# Patient Record
Sex: FEMALE | Race: WHITE
[De-identification: names, ages, dates, MRNs, and addresses within clinical notes are randomized per-mention and may not be internally consistent; named-entity substitution may affect disease eponyms.]

---

## 2018-05-04 ENCOUNTER — HOSPITAL ENCOUNTER (EMERGENCY)
Dept: HOSPITAL 31 - C.ER | Age: 43
Discharge: HOME | End: 2018-05-04
Payer: COMMERCIAL

## 2018-05-04 VITALS — BODY MASS INDEX: 30.6 KG/M2

## 2018-05-04 VITALS
OXYGEN SATURATION: 99 % | HEART RATE: 78 BPM | SYSTOLIC BLOOD PRESSURE: 128 MMHG | TEMPERATURE: 99.2 F | DIASTOLIC BLOOD PRESSURE: 76 MMHG

## 2018-05-04 VITALS — RESPIRATION RATE: 18 BRPM

## 2018-05-04 DIAGNOSIS — T62.91XA: Primary | ICD-10-CM

## 2018-05-04 DIAGNOSIS — R11.2: ICD-10-CM

## 2018-05-04 LAB
ALBUMIN SERPL-MCNC: 3.7 G/DL (ref 3.5–5)
ALBUMIN/GLOB SERPL: 1.1 {RATIO} (ref 1–2.1)
ALT SERPL-CCNC: 24 U/L (ref 9–52)
APTT BLD: 28 SECONDS (ref 21–34)
AST SERPL-CCNC: 20 U/L (ref 14–36)
BASOPHILS # BLD AUTO: 0 K/UL (ref 0–0.2)
BASOPHILS NFR BLD: 0.3 % (ref 0–2)
BILIRUB UR-MCNC: NEGATIVE MG/DL
BUN SERPL-MCNC: 10 MG/DL (ref 7–17)
CALCIUM SERPL-MCNC: 8.4 MG/DL (ref 8.6–10.4)
EOSINOPHIL # BLD AUTO: 0.1 K/UL (ref 0–0.7)
EOSINOPHIL NFR BLD: 1.3 % (ref 0–4)
ERYTHROCYTE [DISTWIDTH] IN BLOOD BY AUTOMATED COUNT: 13.5 % (ref 11.5–14.5)
GFR NON-AFRICAN AMERICAN: > 60
GLUCOSE UR STRIP-MCNC: NORMAL MG/DL
HCG,QUALITATIVE URINE: NEGATIVE
HGB BLD-MCNC: 14.3 G/DL (ref 11–16)
INR PPP: 1.3
LEUKOCYTE ESTERASE UR-ACNC: (no result) LEU/UL
LIPASE: 70 U/L (ref 23–300)
LYMPHOCYTES # BLD AUTO: 0.9 K/UL (ref 1–4.3)
LYMPHOCYTES NFR BLD AUTO: 11.1 % (ref 20–40)
MCH RBC QN AUTO: 30.6 PG (ref 27–31)
MCHC RBC AUTO-ENTMCNC: 35 G/DL (ref 33–37)
MCV RBC AUTO: 87.6 FL (ref 81–99)
MONOCYTES # BLD: 0.4 K/UL (ref 0–0.8)
MONOCYTES NFR BLD: 5.5 % (ref 0–10)
NEUTROPHILS # BLD: 6.5 K/UL (ref 1.8–7)
NEUTROPHILS NFR BLD AUTO: 81.8 % (ref 50–75)
NRBC BLD AUTO-RTO: 0 % (ref 0–2)
PH UR STRIP: 6 [PH] (ref 5–8)
PLATELET # BLD: 279 K/UL (ref 130–400)
PMV BLD AUTO: 7.4 FL (ref 7.2–11.7)
PROT UR STRIP-MCNC: NEGATIVE MG/DL
PROTHROMBIN TIME: 14.3 SECONDS (ref 9.7–12.2)
RBC # BLD AUTO: 4.67 MIL/UL (ref 3.8–5.2)
RBC # UR STRIP: NEGATIVE /UL
SP GR UR STRIP: 1.02 (ref 1–1.03)
SQUAMOUS EPITHIAL: 1 /HPF (ref 0–5)
UROBILINOGEN UR-MCNC: NORMAL MG/DL (ref 0.2–1)
WBC # BLD AUTO: 8 K/UL (ref 4.8–10.8)

## 2018-05-04 PROCEDURE — 85610 PROTHROMBIN TIME: CPT

## 2018-05-04 PROCEDURE — 85025 COMPLETE CBC W/AUTO DIFF WBC: CPT

## 2018-05-04 PROCEDURE — 85730 THROMBOPLASTIN TIME PARTIAL: CPT

## 2018-05-04 PROCEDURE — 99285 EMERGENCY DEPT VISIT HI MDM: CPT

## 2018-05-04 PROCEDURE — 80053 COMPREHEN METABOLIC PANEL: CPT

## 2018-05-04 PROCEDURE — 83690 ASSAY OF LIPASE: CPT

## 2018-05-04 PROCEDURE — 84703 CHORIONIC GONADOTROPIN ASSAY: CPT

## 2018-05-04 PROCEDURE — 81001 URINALYSIS AUTO W/SCOPE: CPT

## 2018-05-04 PROCEDURE — 96374 THER/PROPH/DIAG INJ IV PUSH: CPT

## 2018-05-04 NOTE — C.PDOC
History Of Present Illness


Patient presents to the ER with a complaint of nausea, vomiting, and abdominal 

pain that began tonight. Patient reports she had rice, beans, and eggs for 

dinner prior to onset of symptoms. Denies fever or diarrhea.


Time Seen by Provider: 05/04/18 00:52


Chief Complaint (Nursing): GI Problem


History Per: Patient


History/Exam Limitations: no limitations


Onset/Duration Of Symptoms: Days


Current Symptoms Are (Timing): Still Present


Severity: Moderate


Pain Scale Rating Of: 4


Location Of Pain/Discomfort: Epigastric


Radiation Of Pain To:: None


Quality Of Discomfort: Unable To Describe


Associated Symptoms: Nausea, Vomiting.  denies: Diarrhea


Exacerbating Factors: None


Alleviating Factors: None


Recent travel outside of the [place]: No


Abnormal Vaginal Bleeding: No





Past Medical History


Reviewed: Historical Data, Nursing Documentation, Vital Signs


Vital Signs: 


 Last Vital Signs











Temp  98.5 F   05/04/18 00:53


 


Pulse  76   05/04/18 02:12


 


Resp  18   05/04/18 02:12


 


BP  126/78   05/04/18 02:12


 


Pulse Ox  98   05/04/18 02:12














- Medical History


PMH: Back Problems (chronic right mid back pain ), HTN





- [facility] Procedures








INDIVID PSYCHOTHERAP NEC (10/24/13)


INJECT/INFUSE NEC (03/10/14)








Family History: States: No Known Family Hx





- Social History


Hx Tobacco Use: No


Hx Alcohol Use: No


Hx Substance Use: No





- Immunization History


Hx Tetanus Toxoid Vaccination: No


Hx Influenza Vaccination: Yes


Hx Pneumococcal Vaccination: No





Review Of Systems


Constitutional: Negative for: Fever, Chills


Cardiovascular: Negative for: Chest Pain


Respiratory: Negative for: Cough


Gastrointestinal: Positive for: Nausea, Vomiting, Abdominal Pain.  Negative for

: Diarrhea





Physical Exam





- Physical Exam


Appears: Non-toxic


Skin: Warm, Dry


Head: Normacephalic


Oral Mucosa: Moist


Chest: Symmetrical, No Tenderness


Cardiovascular: Rhythm Regular


Respiratory: No Rales, No Rhonchi, No Wheezing


Gastrointestinal/Abdominal: Soft, Tenderness (Mild mid epigastric), No Guarding

, No Rebound


Neurological/Psych: Oriented x3





ED Course And Treatment





- Laboratory Results


Result Diagrams: 


 05/04/18 01:23





 05/04/18 01:23


O2 Sat by Pulse Oximetry: 98


Pulse Ox Interpretation: Normal


Progress Note: Blood work and urinalysis ordered. IV fluids and zofran 

administered.


Reevaluation Time: 02:31


Reassessment Condition: Improved





Disposition


Counseled Patient/Family Regarding: Studies Performed, Diagnosis, Need For 

Followup, Rx Given





- Disposition


Referrals: 


Alexander Brooks MD [Medical Doctor] - 


Disposition: HOME/ ROUTINE


Disposition Time: 00:53


Condition: FAIR


Prescriptions: 


Ondansetron ODT [Zofran ODT] 1 odt PO BID PRN #6 odt


 PRN Reason: Nausea/Vomiting


Instructions:  Nausea and Vomiting, Adult (DC), Food Poisoning (DC)


Forms:  [employer] (English)


Print Language: [place]





- Clinical Impression


Clinical Impression: 


 Nausea & vomiting, Food poisoning








- Scribe Statement


The provider has reviewed the documentation as recorded by the Scribshanique Prince





All medical record entries made by the Scribe were at my direction and 

personally dictated by me. I have reviewed the chart and agree that the record 

accurately reflects my personal performance of the history, physical exam, 

medical decision making, and the department course for this patient. I have 

also personally directed, reviewed, and agree with the discharge instructions 

and disposition.

## 2018-12-02 ENCOUNTER — HOSPITAL ENCOUNTER (EMERGENCY)
Dept: HOSPITAL 31 - C.ER | Age: 43
Discharge: HOME | End: 2018-12-02
Payer: COMMERCIAL

## 2018-12-02 VITALS
OXYGEN SATURATION: 97 % | SYSTOLIC BLOOD PRESSURE: 157 MMHG | HEART RATE: 70 BPM | TEMPERATURE: 98.3 F | DIASTOLIC BLOOD PRESSURE: 82 MMHG | RESPIRATION RATE: 18 BRPM

## 2018-12-02 VITALS — BODY MASS INDEX: 32.9 KG/M2

## 2018-12-02 DIAGNOSIS — I10: ICD-10-CM

## 2018-12-02 DIAGNOSIS — M79.605: Primary | ICD-10-CM

## 2018-12-02 NOTE — C.PDOC
History Of Present Illness





43 year old female presents to ED complaining of a non-traumatic pain to her 

left lower leg, more on the lateral side, for the past 4 days. Patient has had 

no similar pain from the past. Patient states she took a flight at the beginning

of last month and is not on birth control. States she did not take anything for 

pain today.





Time Seen by Provider: 12/02/18 16:47


Chief Complaint (Nursing): Lower Extremity Problem/Injury


History Per: Patient


History/Exam Limitations: no limitations


Onset/Duration Of Symptoms: Days


Current Symptoms Are (Timing): Still Present





Past Medical History


Reviewed: Historical Data, Nursing Documentation, Vital Signs


Vital Signs: 





                                Last Vital Signs











Temp  98.3 F   12/02/18 16:38


 


Pulse  70   12/02/18 16:38


 


Resp  18   12/02/18 16:38


 


BP  157/82 H  12/02/18 16:38


 


Pulse Ox  97   12/02/18 16:38














- Medical History


PMH: Asthma, Back Problems (chronic right mid back pain ), HTN


Surgical History: Appendectomy





- [facility] Procedures











INDIVID PSYCHOTHERAP NEC (10/24/13)


INJECT/INFUSE NEC (03/10/14)








Family History: States: No Known Family Hx





- Social History


Hx Tobacco Use: No


Hx Alcohol Use: No


Hx Substance Use: No





- Immunization History


Hx Tetanus Toxoid Vaccination: No


Hx Influenza Vaccination: No


Hx Pneumococcal Vaccination: No





Review Of Systems


Except As Marked, All Systems Reviewed And Found Negative.


Musculoskeletal: Positive for: Leg Pain (lower left)


Neurological: Negative for: Weakness, Numbness





Physical Exam





- Physical Exam


Appears: Non-toxic, No Acute Distress


Skin: Warm, Dry


Head: Atraumatic, Normacephalic


Eye(s): bilateral: Normal Inspection


Oral Mucosa: Moist


Neck: Supple


Chest: Symmetrical


Cardiovascular: Rhythm Regular, No Murmur


Respiratory: Normal Breath Sounds, No Rales, No Rhonchi, No Wheezing


Gastrointestinal/Abdominal: Normal Exam


Extremity: Other (No significant swelling but has pain to palpation of her left 

posterior leg and left lateral lower leg)


Extremity: Bilateral: Normal Color And Temperature, Normal ROM


Neurological/Psych: Oriented x3, Normal Speech, Normal Motor, Normal Sensation





ED Course And Treatment


O2 Sat by Pulse Oximetry: 97 (RA)


Pulse Ox Interpretation: Normal





- Other Rad


  ** Left tib fib xray


X-Ray: Interpreted by Me


Interpretation: no acute fx or dislocation. small calcification





Medical Decision Making


Medical Decision Making: 





Plan:


--D-dimer


--Ibuprofen


-xray





Disposition


Counseled Patient/Family Regarding: Studies Performed, Diagnosis, Need For 

Followup, Rx Given





- Disposition


Referrals: 


Alexander Brooks MD [Medical Doctor] - 


Disposition: HOME/ ROUTINE


Disposition Time: 18:01


Condition: STABLE


Additional Instructions: 


FOLLOW UP WITH YOUR PMD TOMORROW FOR RE-EVALUATION AND OFFICIAL XRAY REPORT. 





IF SYMPTOMS GET WORSE OR ANY NEW CONCERNING SYMPTOMS DEVELOP RETURN TO ED. 


Prescriptions: 


Ibuprofen [Motrin Tab] 1 tab PO Q6H PRN #15 tab


 PRN Reason: Pain, Moderate (4-7)


Instructions:  Muscle and Bone Pain (DC)


Forms:  [employer] (English), Gen Discharge Inst Maldivian


Print Language: Macedonian





- Clinical Impression


Clinical Impression: 


 Leg pain, left








- PA / NP / Resident Statement


MD/DO has reviewed & agrees with the documentation as recorded.





- Scribe Statement


The provider has reviewed the documentation as recorded by the Scribshanique Vance





All medical record entries made by the Arianibshanique were at my direction and 

personally dictated by me. I have reviewed the chart and agree that the record 

accurately reflects my personal performance of the history, physical exam, 

medical decision making, and the department course for this patient. I have also

personally directed, reviewed, and agree with the discharge instructions and 

disposition.

## 2018-12-03 NOTE — RAD
Date of service: 



12/02/2018



PROCEDURE:  Radiographs of the left tibia and fibula. 



HISTORY:

Pain 



COMPARISON:

None available.



TECHNIQUE:

Frontal and lateral views obtained. 



FINDINGS:



BONES:

Bone alignment and mineralization are normal.  There is no acute 

displaced fracture or bone destruction.



JOINT SPACES:

Unremarkable.



OTHER FINDINGS:

None.



IMPRESSION:

No acute fracture or dislocation.